# Patient Record
(demographics unavailable — no encounter records)

---

## 2025-06-07 NOTE — DISCUSSION/SUMMARY
[de-identified] : I reviewed the x-ray findings with the patient.  She was placed into a more appropriately fitted sling and remain in the sling at all times except for hygiene..  She is ibuprofen, acetaminophen and oxycodone at home which she will use for pain.  She will work on elbow I did explain she may lose range of motion with the fracture.  She will work on elbow, wrist and digit range of motion.  I did explain she may lose range of motion with this type of fracture.  She will follow-up in 2 weeks for further evaluation with Dr. Beltran.

## 2025-06-07 NOTE — IMAGING
[de-identified] : Physical exam of the right shoulder: There is ecchymosis noted of the mid bicep.  Tenderness to palpation of the proximal humerus.  Range of motion deferred secondary to fracture.  Good range of motion with flexion extension of the right elbow.  Intact to light touch.

## 2025-06-07 NOTE — HISTORY OF PRESENT ILLNESS
[de-identified] : The patient is a 71-year-old female accompanied by her spouse here for an evaluation of her right shoulder.  She fell downstairs on 6/4/2025, injuring the right shoulder.  She was seen and evaluated at [facility] and had x-rays of the right shoulder that showed a displaced fracture of the proximal humerus and neck of the humerus.  She presents today in an ill fitted sling from the hospital.

## 2025-06-20 NOTE — ASSESSMENT
[FreeTextEntry1] : 71-year-old relatively low demand woman right-hand-dominant valgus impacted right proximal humerus fracture.  Discussed treatment options.  Patient prefers nonoperative management.  Understands that she will lose some motion in her shoulder.  Agree with her assessment.  Recommend sling for comfort.  No active range of motion of shoulder.  Pendulum exercises and elbow range of motion exercises.  Follow-up in 2 to 3 weeks for repeat evaluation radiographs of right shoulder.  Will teach the patient active assisted forward flexion exercises at that time.  All questions answered to her satisfaction.  Agrees with plan.

## 2025-06-20 NOTE — HISTORY OF PRESENT ILLNESS
[de-identified] : 71-year-old woman presents my office for evaluation of her right hand dominant proximal humerus that sustained as a result of a ground-level fall on June 4, 2025.  Seen at [facility] she was worked up with plain radiographs and a CAT scan.  Referred to my office for management having been seen in the walk-in clinic.  She remains in the sling.  Her pain is well-controlled with Tylenol and ibuprofen.  No sensory complaints.  No prior history of shoulder problems or shoulder fractures.  Past medical history: Hyperlipidemia Hypertension  Medications:  Low-dose aspirin Ramipril Simvastatin  Allergies NKDA  Social: , non-smoker no EtOH no drug use

## 2025-07-29 NOTE — ASSESSMENT
[FreeTextEntry1] : 71-year-old in 6 weeks nonoperative management right proximal humerus fracture healing uneventfully.  Recommend physical therapy.  Follow-up in 6 to 8 weeks time for repeat evaluation.  Can continue with Tylenol and/or ibuprofen as needed for pain relief.  Does not need sling anymore.  5 pound lifting restriction which can be advanced to 10 pounds in 3 weeks.  All questions answered to the patient satisfaction.  Agrees with plan.

## 2025-07-29 NOTE — IMAGING
[de-identified] : Pleasant middle-age woman sits comfortably my office no distress.  She companied by her  in the exam room.  Physical examination: Right shoulder: No tenderness palpation along acromial arch or clavicle.  No tenderness about her proximal humerus.  There is some atrophy of the shoulder girdle musculature from disuse.  Active assisted forward flexion is to 85 degrees and with gentle assistance I can get her just about 90 degrees.  She has normal light sensation exiting nerve distribution.  Radiographs: Right shoulder (AP, internal Tatian lateral, Y scapular): Valgus impacted right proximal humerus fracture.  Humeral head is healed the shaft.  Tuberosity fracture fragment remains in position with further displacement from last x-ray.

## 2025-07-29 NOTE — HISTORY OF PRESENT ILLNESS
[de-identified] : 71-year-old woman returns for interval follow-up right proximal humerus fracture sustained June 4, 2025.  She has been participating self-directed exercise program.  Notes pain is significantly better.  Still takes Tylenol and/or ibuprofen over-the-counter once a day as needed for pain.  No sensory complaints.  Utilizes sling only at night.  She is accompanied by her .